# Patient Record
Sex: FEMALE | Race: WHITE
[De-identification: names, ages, dates, MRNs, and addresses within clinical notes are randomized per-mention and may not be internally consistent; named-entity substitution may affect disease eponyms.]

---

## 2017-03-13 ENCOUNTER — HOSPITAL ENCOUNTER (OUTPATIENT)
Dept: HOSPITAL 47 - RADMAMWWP | Age: 62
End: 2017-03-13
Attending: RADIOLOGY
Payer: COMMERCIAL

## 2017-03-13 DIAGNOSIS — Z85.3: Primary | ICD-10-CM

## 2017-03-13 NOTE — MM
Reason for exam: history of breast cancer, conservation therapy.

Last mammogram was performed 1 year ago.



History:

Patient is postmenopausal, has history of other cancer at age 54, has history of 

breast cancer at age 51, and is nulliparous.

Family history of breast cancer in sister at age 70.

Excisional biopsy of the left breast, September 21, 2007.  Malignant lumpectomy of

the left breast, September 21, 2007.  Malignant left mammotome panel of the left 

breast, September 12, 2007.  Radiation therapy of the left breast, 2007.

Took estrogen for 7 years 6 months beginning at age 44.  Took progesterone for 7 

years 6 months beginning at age 44.



Physical Findings:

Nurse did not find any significant physical abnormalities on exam.



MG 3D Diag Mammo W/Cad VIRAJ

Bilateral CC and MLO view(s) were taken.

Prior study comparison: March 10, 2016, bilateral MG 3d diag mammo w/cad VIRAJ.  

March 9, 2015, bilateral MG diagnostic mammo w CAD VIRAJ.

No significant new findings when compared with previous films.



These results were verbally communicated with the patient and result sheet given 

to the patient on 3/13/17.





ASSESSMENT: Benign, BI-RAD 2



RECOMMENDATION:

Follow-up diagnostic mammogram of both breasts in 1 year.

## 2018-03-15 ENCOUNTER — HOSPITAL ENCOUNTER (OUTPATIENT)
Dept: HOSPITAL 47 - RADMAMWWP | Age: 63
Discharge: HOME | End: 2018-03-15
Attending: RADIOLOGY
Payer: COMMERCIAL

## 2018-03-15 DIAGNOSIS — Z08: Primary | ICD-10-CM

## 2018-03-15 DIAGNOSIS — Z85.3: ICD-10-CM

## 2018-03-15 PROCEDURE — 77066 DX MAMMO INCL CAD BI: CPT

## 2018-03-15 NOTE — MM
Reason for exam: additional evaluation requested from prior study.

Last mammogram was performed 1 year ago.



History:

Patient is postmenopausal, has history of other cancer at age 54, has history of 

breast cancer at age 51, and is nulliparous.

Family history of breast cancer in sister at age 70.

Excisional biopsy of the left breast, September 21, 2007.  Malignant lumpectomy of

the left breast, September 21, 2007.  Malignant left mammotome panel of the left 

breast, September 12, 2007.  Radiation therapy of the left breast, 2007.

Took estrogen for 7 years 6 months beginning at age 44.  Took progesterone for 7 

years 6 months beginning at age 44.



Physical Findings:

Nurse did not find any significant physical abnormalities on exam.



MG 3D Diag Mammo W/Cad VIRAJ

Bilateral CC and MLO view(s) were taken.

Prior study comparison: March 13, 2017, bilateral MG 3d diag mammo w/cad VIRAJ.  

March 10, 2016, bilateral MG 3d diag mammo w/cad VIRAJ.

The breast tissue is heterogeneously dense. This may lower the sensitivity of 

mammography.  Post surgical changes in the left breast.

No significant new findings when compared with previous films.



These results were verbally communicated with the patient and result sheet given 

to the patient on 3/15/18.





ASSESSMENT: Benign, BI-RAD 2



RECOMMENDATION:

Routine screening mammogram of both breasts in 1 year.

## 2018-07-08 ENCOUNTER — HOSPITAL ENCOUNTER (EMERGENCY)
Dept: HOSPITAL 47 - EC | Age: 63
Discharge: HOME | End: 2018-07-08
Payer: COMMERCIAL

## 2018-07-08 VITALS
HEART RATE: 68 BPM | RESPIRATION RATE: 18 BRPM | DIASTOLIC BLOOD PRESSURE: 75 MMHG | TEMPERATURE: 98.4 F | SYSTOLIC BLOOD PRESSURE: 131 MMHG

## 2018-07-08 DIAGNOSIS — Z88.2: ICD-10-CM

## 2018-07-08 DIAGNOSIS — S42.255A: Primary | ICD-10-CM
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
## 2018-07-08 DIAGNOSIS — Z88.1: ICD-10-CM

## 2018-07-08 DIAGNOSIS — Z87.891: ICD-10-CM

## 2018-07-08 DIAGNOSIS — Z88.0: ICD-10-CM

## 2018-07-08 DIAGNOSIS — Z88.8: ICD-10-CM

## 2018-07-08 DIAGNOSIS — Z88.6: ICD-10-CM

## 2018-07-08 PROCEDURE — 99283 EMERGENCY DEPT VISIT LOW MDM: CPT

## 2018-07-08 NOTE — ED
General Adult HPI





- General


Chief complaint: Extremity Injury, Upper


Stated complaint: LEFT SHOULDER INJURY FROM FALL


Time Seen by Provider: 07/08/18 17:02


Source: patient, RN notes reviewed


Mode of arrival: ambulatory


Limitations: no limitations





- History of Present Illness


Initial comments: 





Patient 62-year-old female presenting to the emergency room today with a chief 

complaint of a fall that occurred approximately 12 skin appears that she was 

walking and then tripped over the first step onto the left shoulder.  She 

states there is pain locally states seems to be doing well at this time.  

States she has been able to ambulate.  Patient admits to pain in the left 

shoulder worse with movements.  Patient denies any other complaints or 

symptoms. Patient denies any recent fever, chills, shortness of breath, chest 

pain, back pain, abdominal pain, nausea or vomiting, numbness or tingling, 

headaches or visual changes, or any other complaints.





- Related Data


 Allergies











Allergy/AdvReac Type Severity Reaction Status Date / Time


 


acetaminophen [From Tylenol] Allergy  Rash/Hives Verified 07/08/18 16:36


 


cephalexin [From Keflex] Allergy  Rash/Hives Verified 07/08/18 16:36


 


Cephalosporins Allergy  Rash/Hives Verified 07/08/18 16:36


 


erythromycin base Allergy  Rash/Hives Verified 07/08/18 16:36


 


nitrofurantoin Allergy  Rash/Hives Verified 07/08/18 16:36





[From Macrodantin]     


 


Penicillins Allergy  Rash/Hives Verified 07/08/18 16:36


 


Sulfa (Sulfonamide Allergy  Rash/Hives Verified 07/08/18 16:36





Antibiotics)     


 


Tetracyclines Allergy  Rash/Hives Verified 07/08/18 16:36


 


anicin Allergy  Rash/Hives Uncoded 07/08/18 16:36














Review of Systems


ROS Statement: 


Those systems with pertinent positive or pertinent negative responses have been 

documented in the HPI.





ROS Other: All systems not noted in ROS Statement are negative.





Past Medical History


Past Medical History: Hyperlipidemia, Hypertension, Thyroid Disorder


History of Any Multi-Drug Resistant Organisms: None Reported


Additional Past Surgical History / Comment(s): thyroidectomy, endometrisis sx


Past Psychological History: Anxiety


Smoking Status: Former smoker


Past Alcohol Use History: Occasional


Past Drug Use History: None Reported





General Exam





- General Exam Comments


Initial Comments: 





General:  The patient is awake and alert, in no distress, and does not appear 

acutely ill.   


Neck:  The neck is supple, there is no tenderness or JVD.  


Cardiovascular:  There is a regular rate and rhythm. No murmur, rub or gallop 

is appreciated.


Respiratory:  Lungs are clear to auscultation, respirations are non-labored, 

breath sounds are equal.  No wheezes, stridor, rales, or rhonchi.


Musculoskeletal: Patient does have tenderness to the proximal humerus.  Shows 

limited range of motion due to pain.  Sensations are intact.  Radial pulses 2+.

  Strength from the left elbow and wrist and hand are 5/5.


Neurological:  A&O x 3. CN II-XII intact, There are no obvious motor or sensory 

deficits. Coordination appears grossly intact. Speech is normal.


Skin:  Skin is warm and dry and no rashes or lesions are noted. 


Psychiatric:  Normal mood and affect.  


Limitations: no limitations





Course


 Vital Signs











  07/08/18





  16:28


 


Temperature 98.4 F


 


Pulse Rate 68


 


Respiratory 18





Rate 


 


Blood Pressure 131/75


 


O2 Sat by Pulse 100





Oximetry 














Medical Decision Making





- Medical Decision Making





X-ray reviewed and does show fracture through the greater tuberosity 

nondisplaced.  Results were discussed with the patient.  Patient given arm 

sling and advised follow-up orthopedics.  Patient does not want anything for 

pain.  Will be discharged home advised to ice area.





Disposition


Clinical Impression: 


 Greater tuberosity of humerus fracture





Disposition: HOME SELF-CARE


Condition: Good


Additional Instructions: 


Please follow-up the family doctor or orthopedics as discussed.  Please use arm 

sling when up and moving around.  Please return to emergency room for any other 

concerns.


Is patient prescribed a controlled substance at d/c from ED?: No


Referrals: 


Pro Marie MD [Primary Care Provider] - 1-2 days


Abilio Rodriguez MD [STAFF PHYSICIAN] - 1-2 days


Time of Disposition: 17:40

## 2018-07-08 NOTE — XR
EXAMINATION TYPE: XR shoulder complete LT

 

DATE OF EXAM: 7/8/2018

 

COMPARISON: NONE

 

HISTORY: 62-year-old female with pain after fall

 

TECHNIQUE: 3 views

 

FINDINGS: 

Nondisplaced fracture of the greater tuberosity. Subacromial space is preserved. No subluxation or di
slocation. AC joint appears intact.

 

IMPRESSION: 

Nondisplaced greater tuberosity fracture. Appropriate orthopedic follow-up recommended.

## 2018-07-23 ENCOUNTER — HOSPITAL ENCOUNTER (OUTPATIENT)
Dept: HOSPITAL 47 - RADCTMAIN | Age: 63
Discharge: HOME | End: 2018-07-23
Attending: ORTHOPAEDIC SURGERY
Payer: COMMERCIAL

## 2018-07-23 DIAGNOSIS — S42.255A: Primary | ICD-10-CM

## 2018-07-23 NOTE — CT
EXAMINATION TYPE: CT shoulder LT wo con

 

DATE OF EXAM: 7/23/2018

 

COMPARISON: 7/8/2018 plain film

 

HISTORY: Left shoulder pain from fall

 

CT DLP: 313.80 mGycm

Automated exposure control for dose reduction was used. Technique: Axial images 2 mm thick sections. 
Reconstructed images in the sagittal and coronal plane. Three-D reconstructed images performed separa
tely on the [employer] computer are filmed and presented.

 

FINDINGS: 

The humeral head articulates with the glenoid. The acromioclavicular junction appears normal.

 

There is a subtle nondisplaced fracture of the greater tuberosity of the left humerus. No additional 
fractures are identified. Soft tissue density appears normal. No obvious large effusions are evident.
 There does appear to be some joint space narrowing suggestive for osteoarthritic degenerative change
 at the humeral glenoid junction.

 

IMPRESSION: 

SUBTLE NONDISPLACED FRACTURE GREATER TUBEROSITY LEFT HUMERUS. NO SIGNIFICANT INTERVAL HEALING IDENTIF
IED..

## 2018-12-31 ENCOUNTER — HOSPITAL ENCOUNTER (OUTPATIENT)
Dept: HOSPITAL 47 - RADCTMAIN | Age: 63
End: 2018-12-31
Attending: FAMILY MEDICINE
Payer: MEDICARE

## 2018-12-31 DIAGNOSIS — R94.02: ICD-10-CM

## 2018-12-31 DIAGNOSIS — I67.2: Primary | ICD-10-CM

## 2018-12-31 PROCEDURE — 70450 CT HEAD/BRAIN W/O DYE: CPT

## 2018-12-31 PROCEDURE — 93306 TTE W/DOPPLER COMPLETE: CPT

## 2018-12-31 PROCEDURE — 93880 EXTRACRANIAL BILAT STUDY: CPT

## 2018-12-31 NOTE — ECHOF
Referral Reason:R55 Syncope



MEASUREMENTS

--------

HEIGHT: 170.2 cm

WEIGHT: 71.7 kg

BP: 

IVSd:   1.2 cm     (0.6 - 1.1)

LVIDd:   3.9 cm     (3.9 - 5.3)

LVPWd:   1.5 cm     (0.6 - 1.1)

IVSs:   1.5 cm

LVIDs:   2.8 cm

LVPWs:   1.6 cm

LA Diam:   3.7 cm     (2.7 - 3.8)

RVIDd:   2.5 cm     (< 3.3)

LAESV Index (A-L):   28.73 ml/m

Ao Diam:   2.3 cm     (2.0 - 3.7)

LA Diam:   4.0 cm     (2.7 - 3.8)

AV Cusp:   1.6 cm     (1.5 - 2.6)

EPSS:   0.3 cm

MV E Oswald:   0.79 m/s

MV DecT:   253 ms

MV A Oswald:   0.70 m/s

MV E/A Ratio:   1.12 

RAP:   5.00 mmHg

RVSP:   12.42 mmHg

MV EF SLOPE:   90.62 mm/s     (70 - 150)

MV EXCURSION:   19.44 mm     (> 18.000)







FINDINGS

--------

Sinus rhythm.

This was a technically good study.

The left ventricular size is normal.   There is borderline concentric left ventricular hypertrophy.  
 Overall left ventricular systolic function is normal with, an EF between 55 - 60 %.

The right ventricle is normal in size.

The left atrial size is normal.

The right atrial size is normal.

The aortic valve is trileaflet, and appears structurally normal. No aortic stenosis or regurgitation.


Mild mitral regurgitation is present.

Mild tricuspid regurgitation present.   There is no evidence of pulmonary hypertension.   The right v
entricular systolic pressure, as measured by Doppler, is 12.42mmHg.

Trace/mild (physiologic)  pulmonic regurgitation.

The aortic root size is normal.

There is no pericardial effusion.



CONCLUSIONS

--------

1. The left ventricular size is normal.

2. There is borderline concentric left ventricular hypertrophy.

3. The right ventricle is normal in size.

4. The left atrial size is normal.

5. The right atrial size is normal.

6. The aortic valve is trileaflet, and appears structurally normal. No aortic stenosis or regurgitati
on.

7. Mild mitral regurgitation is present.

8. Mild tricuspid regurgitation present.

9. There is no evidence of pulmonary hypertension.

10. The right ventricular systolic pressure, as measured by Doppler, is 12.42mmHg.

11. Trace/mild (physiologic)  pulmonic regurgitation.

12. The aortic root size is normal.

13. There is no pericardial effusion.





SONOGRAPHER: Danelle Malone RDCS

## 2018-12-31 NOTE — CT
EXAMINATION TYPE: CT brain wo con

 

DATE OF EXAM: 12/31/2018

 

COMPARISON: None

 

HISTORY: Trouble with balance, equilibrium instability

 

CT DLP: 945.5 mGycm

Automated exposure control for dose reduction was used.

 

TECHNIQUE: CT scan of the head is performed without contrast.

 

FINDINGS:   There is no acute intracranial hemorrhage or midline shift identified. There is symmetric
 and mild ventricular and sulcal prominence consistent with diffuse age-related cerebral atrophy.  Th
ere is minimal low-attenuation in the periventricular white matter and within the lulu such as on axi
al image 13. No suspicious extra-axial fluid collection is seen. The globes are intact and the visual
ized sinuses are clear.   Chrissy bullosa are incidentally noted.

 

IMPRESSION:  

1. No acute intracranial hemorrhage or midline shift.

2. Mild periventricular and pontine hypointensity may be on the basis of microangiopathy however coul
d be further evaluated with MRI.

## 2018-12-31 NOTE — US
EXAMINATION TYPE: US carotid duplex BILAT

 

DATE OF EXAM: 12/31/2018

 

COMPARISON: NONE

 

CLINICAL HISTORY: R55 Syncope.

 

EXAM MEASUREMENTS: 

 

RIGHT:  Peak Systolic Velocity (PSV) cm/sec

----- Right CCA:  57.4  

----- Right ICA:  78.2     

----- Right ECA:  59.5   

ICA/CCA ratio:  1.4    

 

RIGHT:  End Diastole cm/sec

----- Right CCA:  17.3   

----- Right ICA:  28.5      

----- Right ECA:  7.2     

 

LEFT:  Peak Systolic Velocity (PSV) cm/sec

----- Left CCA:  68.3  

----- Left ICA:  60.9   

----- Left ECA:  67.0  

ICA/CCA ratio:  0.9  

 

LEFT:  End Diastole cm/sec

----- Left CCA:  17.8  

----- Left ICA:  25.0   

----- Left ECA:  8.4 

 

VERTEBRALS (direction of flow):

Right Vertebral: Antegrade

Left Vertebral: Antegrade

 

Rhythm:  Normal

 

No elevated velocities, no significant stenosis.

 

 

 

IMPRESSION:  Mild degree of grayscale atheromatous plaquing with no sonographically evident hemodynam
ically significant stenosis within either visualized carotid arterial system.   

 

 

Criteria for Assigning % of Stenosis / Diameter reduction

(Estimation based on the indirect measurements of the internal carotid artery velocities (ICA PSV).

1.  Normal (no stenosis)=ICA PSV < 125 cm/s: ratio < 2.0: ICA EDV<40 cm/s.

2. Less than 50% stenosis=ICA PSV < 125 cm/s: ratio < 2.0: ICA EDV<40 cm/s.

3.  50 to 69% stenosis=ICA PSV of 125 to 230 cm/s: ration 2.0 ? 4.0: ICA EDV  cm/s.

4.  Greater than 70% stenosis to near occlusion= ICA PSV > 230 cm/s: ratio > 4.0: ICA EDV > 100 cm/s.
 

5.  Near occlusion= ICA PSV velocities may be low or undetectable: variable ratio and ICA EDV.

6.  Total occlusion=unable to detect flow.

## 2019-03-18 ENCOUNTER — HOSPITAL ENCOUNTER (OUTPATIENT)
Dept: HOSPITAL 47 - RADMAMWWP | Age: 64
End: 2019-03-18
Attending: RADIOLOGY
Payer: MEDICARE

## 2019-03-18 DIAGNOSIS — Z85.3: ICD-10-CM

## 2019-03-18 DIAGNOSIS — Z08: Primary | ICD-10-CM

## 2019-03-18 PROCEDURE — 77062 BREAST TOMOSYNTHESIS BI: CPT

## 2019-03-18 PROCEDURE — 77066 DX MAMMO INCL CAD BI: CPT

## 2019-03-18 NOTE — MM
Reason for exam: additional evaluation requested from prior study.

Last mammogram was performed 1 year ago.



History:

Patient is postmenopausal, has history of other cancer at age 54, has history of 

breast cancer at age 51, and is nulliparous.

Family history of breast cancer in sister at age 70.

Excisional biopsy of the left breast, September 21, 2007.  Malignant lumpectomy of

the left breast, September 21, 2007.  Malignant left mammotome panel of the left 

breast, September 12, 2007.  Radiation therapy of the left breast, 2007.

Took estrogen for 7 years 6 months beginning at age 44.  Took progesterone for 7 

years 6 months beginning at age 44.



Physical Findings:

Nurse did not find any significant physical abnormalities on exam.



MG 3D Diag Mammo W/Cad VIRAJ

Bilateral CC and MLO view(s) were taken.

Prior study comparison: March 15, 2018, bilateral MG 3d diag mammo w/cad VIRAJ.  

March 13, 2017, bilateral MG 3d diag mammo w/cad VIRAJ.

The breast tissue is heterogeneously dense. This may lower the sensitivity of 

mammography.  Post surgical and post therapy changes left breast. Central 

asymmetric density right CC view is unchanged.



These results were verbally communicated with the patient and result sheet given 

to the patient on 3/18/19.





ASSESSMENT: Benign, BI-RAD 2



RECOMMENDATION:

Follow-up diagnostic mammogram of both breasts in 1 year.

## 2020-06-04 ENCOUNTER — HOSPITAL ENCOUNTER (OUTPATIENT)
Dept: HOSPITAL 47 - RADMAMWWP | Age: 65
Discharge: HOME | End: 2020-06-04
Payer: MEDICARE

## 2020-06-04 DIAGNOSIS — R92.8: Primary | ICD-10-CM

## 2020-06-04 DIAGNOSIS — Z85.3: ICD-10-CM

## 2020-06-04 PROCEDURE — 77066 DX MAMMO INCL CAD BI: CPT

## 2020-06-04 PROCEDURE — 76642 ULTRASOUND BREAST LIMITED: CPT

## 2020-06-04 PROCEDURE — 77062 BREAST TOMOSYNTHESIS BI: CPT

## 2020-06-09 NOTE — USB
Reason for exam: additional evaluation requested from abnormal screening.



History:

Patient is postmenopausal, has history of other cancer at age 54, has history of 

breast cancer at age 51, and is nulliparous.

Family history of breast cancer in sister at age 70.

Excisional biopsy of the left breast, September 21, 2007.  Malignant lumpectomy of

the left breast, September 21, 2007.  Malignant left mammotome panel of the left 

breast, September 12, 2007.  Radiation therapy of the left breast, 2007.

Took estrogen for 7 years 6 months beginning at age 44.  Took progesterone for 7 

years 6 months beginning at age 44.



US Breast Limited RT

Technologist: Quin Benoit

Right limited breast ultrasound including focal area of concern, retroareolar and 

axilla demonstrates a 0.4 x 0.4 x 0.3cm cystic lesion at 6 o'clock. No sonographic

correlate for the right mammographic asymmetry.



These results were verbally communicated with the patient and result sheet given 

to the patient on 6/4/20.





ASSESSMENT: Probably benign, BI-RAD 3



RECOMMENDATION:

Follow-up diagnostic mammogram of the right breast in 6 months.

## 2020-06-09 NOTE — MM
Reason for exam: additional evaluation requested from prior study.

Last mammogram was performed 1 year and 3 months ago.



History:

Patient is postmenopausal, has history of other cancer at age 54, has history of 

breast cancer at age 51, and is nulliparous.

Family history of breast cancer in sister at age 70.

Excisional biopsy of the left breast, September 21, 2007.  Malignant lumpectomy of

the left breast, September 21, 2007.  Malignant left mammotome panel of the left 

breast, September 12, 2007.  Radiation therapy of the left breast, 2007.

Took estrogen for 7 years 6 months beginning at age 44.  Took progesterone for 7 

years 6 months beginning at age 44.



Physical Findings:

Nurse did not find any significant physical abnormalities on exam.



MG 3D Diag Mammo W/Cad VIRAJ

Bilateral CC and MLO view(s) were taken.  ML and spot compression CC view(s) were 

taken of the right breast.

Prior study comparison: March 18, 2019, bilateral MG 3d diag mammo w/cad VIRAJ.  

March 15, 2018, bilateral MG 3d diag mammo w/cad VIRAJ.

The breast tissue is heterogeneously dense. This may lower the sensitivity of 

mammography.  Benign appearing bilateral calcifications. Lateral right posterior 

depth asymmetry persists on additional views with no ML correlate. Ultrasound will

be performed. Post therapy change on the left.



These results were verbally communicated with the patient and result sheet given 

to the patient on 6/4/20.





ASSESSMENT: Incomplete: need additional imaging evaluation, BI-RAD 0



RECOMMENDATION:

Ultrasound of the right breast.

## 2020-12-29 ENCOUNTER — HOSPITAL ENCOUNTER (EMERGENCY)
Dept: HOSPITAL 47 - EC | Age: 65
Discharge: HOME | End: 2020-12-29
Payer: MEDICARE

## 2020-12-29 VITALS
RESPIRATION RATE: 16 BRPM | SYSTOLIC BLOOD PRESSURE: 138 MMHG | DIASTOLIC BLOOD PRESSURE: 79 MMHG | TEMPERATURE: 98 F | HEART RATE: 65 BPM

## 2020-12-29 DIAGNOSIS — Z88.2: ICD-10-CM

## 2020-12-29 DIAGNOSIS — Z88.1: ICD-10-CM

## 2020-12-29 DIAGNOSIS — I10: ICD-10-CM

## 2020-12-29 DIAGNOSIS — S09.90XA: ICD-10-CM
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
## 2020-12-29 DIAGNOSIS — Z88.0: ICD-10-CM

## 2020-12-29 DIAGNOSIS — S63.502A: Primary | ICD-10-CM
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
## 2020-12-29 DIAGNOSIS — Z88.6: ICD-10-CM

## 2020-12-29 DIAGNOSIS — Z88.8: ICD-10-CM

## 2020-12-29 DIAGNOSIS — E78.5: ICD-10-CM

## 2020-12-29 PROCEDURE — 72125 CT NECK SPINE W/O DYE: CPT

## 2020-12-29 PROCEDURE — 99284 EMERGENCY DEPT VISIT MOD MDM: CPT

## 2020-12-29 PROCEDURE — 70450 CT HEAD/BRAIN W/O DYE: CPT

## 2020-12-29 NOTE — CT
EXAMINATION TYPE: CT brain rustam wo con

 

DATE OF EXAM: 12/29/2020

 

COMPARISON: CT brain 12/31/2018

 

HISTORY: Fall, hit back of head

 

CT DLP: 1276.5 mGycm

Automated exposure control for dose reduction was used.

 

TECHNIQUE: CT scan of the head and cervical spine are performed without contrast.

 

FINDINGS:   There is no acute intracranial hemorrhage, mass effect, or midline shift identified.  The
 ventricles and sulci are within normal limits in size.  The globes are intact and the visualized sin
uses are clear.

 

Cervical spine is visualized in its entirety from C1 through upper thoracic levels and demonstrates s
atisfactory alignment without evidence of acute fracture or dislocation.  Prevertebral soft tissue ap
pears within normal limits. Degenerative disc changes are present in the visualized spine, there is f
acet arthropathy change, there is a spinal curvature present centered at the lower cervical spine The
 C1-C2 articulation is unremarkable.  

 

IMPRESSION:

1. There is no acute fracture or dislocation evident in the cervical spine.

2. No acute intracranial hemorrhage, mass effect, or midline shift is seen.

## 2020-12-29 NOTE — ED
Fall HPI





- General


Chief Complaint: Fall


Stated Complaint: Fall


Time Seen by Provider: 12/29/20 14:34


Source: patient, RN notes reviewed


Mode of arrival: ambulatory


Limitations: no limitations





- History of Present Illness


Initial Comments: 





65-year-old female presents emergency Department with chief complaint of fall.  

Patient states she slipped on some ice fell backwards struck her head on 

concrete steps.  Patient states she also injured her left wrist.  Patient and no

loss conscious.  Patient does take aspirin patient does not take any blood 

thinners.  Denies any back pain, hip pain.  She is able to ambulate.  She states

is moderate swelling to her left wrist.  Patient states there is pain with 

movement and palpation to her left wrist she is right-hand-dominant patient 

offers no other complaints.





- Related Data


                                    Allergies











Allergy/AdvReac Type Severity Reaction Status Date / Time


 


acetaminophen [From Tylenol] Allergy  Rash/Hives Verified 12/29/20 14:28


 


cephalexin [From Keflex] Allergy  Rash/Hives Verified 12/29/20 14:28


 


Cephalosporins Allergy  Rash/Hives Verified 12/29/20 14:28


 


erythromycin base Allergy  Rash/Hives Verified 12/29/20 14:28


 


nitrofurantoin Allergy  Rash/Hives Verified 12/29/20 14:28





[From Macrodantin]     


 


Penicillins Allergy  Rash/Hives Verified 12/29/20 14:28


 


Sulfa (Sulfonamide Allergy  Rash/Hives Verified 12/29/20 14:28





Antibiotics)     


 


Tetracyclines Allergy  Rash/Hives Verified 12/29/20 14:28


 


anicin Allergy  Rash/Hives Uncoded 12/29/20 14:28














Review of Systems


ROS Statement: 


Those systems with pertinent positive or pertinent negative responses have been 

documented in the HPI.





ROS Other: All systems not noted in ROS Statement are negative.





Past Medical History


Past Medical History: Hyperlipidemia, Hypertension, Thyroid Disorder


History of Any Multi-Drug Resistant Organisms: None Reported


Additional Past Surgical History / Comment(s): thyroidectomy, endometrisis sx


Past Psychological History: Anxiety


Smoking Status: Never smoker


Past Alcohol Use History: Occasional


Past Drug Use History: None Reported





General Exam


Limitations: no limitations


General appearance: alert, in no apparent distress


Head exam: Present: atraumatic, normocephalic, normal inspection


Eye exam: Present: normal appearance, PERRL, EOMI.  Absent: scleral icterus, 

conjunctival injection, periorbital swelling


ENT exam: Present: normal exam, normal oropharynx, mucous membranes moist


Neck exam: Present: normal inspection, full ROM.  Absent: tenderness, 

meningismus, lymphadenopathy


Respiratory exam: Present: normal lung sounds bilaterally.  Absent: respiratory 

distress, wheezes, rales, rhonchi, stridor


Cardiovascular Exam: Present: regular rate, normal rhythm, normal heart sounds. 

Absent: systolic murmur, diastolic murmur, rubs, gallop, clicks


Extremities exam: Present: other (Left wrist there is moderate swelling noted, 

tenderness over mid wrist, there is no scaphoid tenderness)


Back exam: Present: full ROM.  Absent: tenderness


Neurological exam: Present: alert, oriented X3, CN II-XII intact, reflexes 

normal.  Absent: motor sensory deficit


Skin exam: Present: warm, dry, intact, normal color.  Absent: rash





Course


                                   Vital Signs











  12/29/20





  14:28


 


Temperature 98 F


 


Pulse Rate 65


 


Respiratory 16





Rate 


 


Blood Pressure 138/79


 


O2 Sat by Pulse 99





Oximetry 














Medical Decision Making





- Medical Decision Making





CT of the brain, C-spine are unremarkable.  X-ray reviewed of the left wrist 

with no acute osseous abnormality.  Patient has no scaphoid tenderness.  

Patient's left wrist sprain, mild head injury.  Patient be discharged in stable 

condition.





Disposition


Clinical Impression: 


 Fall, Head injury, Left wrist sprain





Disposition: HOME SELF-CARE


Condition: Stable


Instructions (If sedation given, give patient instructions):  Head Injury (ED), 

Wrist Sprain (ED)


Additional Instructions: 


Please return to the Emergency Department if symptoms worsen or any other 

concerns.


Is patient prescribed a controlled substance at d/c from ED?: No


Referrals: 


Germania Domingo DO [Primary Care Provider] - 1-2 days


Angel Luis Rios DO [Doctor of Osteopathic Medicine] - 1-2 days


Time of Disposition: 15:55

## 2020-12-29 NOTE — XR
Left wrist

 

HISTORY: Pain, trauma

 

4 views of the left wrist

 

Bone mineralization, joint spaces and alignment are maintained.

 

IMPRESSION: No fracture or dislocation is evident. Follow-up as indicated.

## 2021-06-29 ENCOUNTER — HOSPITAL ENCOUNTER (OUTPATIENT)
Dept: HOSPITAL 47 - RADNMMAIN | Age: 66
Discharge: HOME | End: 2021-06-29
Attending: FAMILY MEDICINE
Payer: MEDICARE

## 2021-06-29 DIAGNOSIS — R94.31: Primary | ICD-10-CM

## 2021-06-29 PROCEDURE — 93351 STRESS TTE COMPLETE: CPT

## 2021-06-29 NOTE — P.STRESS
- Stress Test Note


Stress Test Results/Findings: 


Exam Performed:  dobutamine stress echo


Exam Date: 06/29/21


Reason for Exam: Abnormal EKG





Height: 5 ft 7 in


Weight: 77.564 kg


Protocol: Dobutamine


Stage: 40


Duration of Exercise: 14:20





Resting Heart Rate: 64


Resting Blood Pressure: 127/50


Maximum Achieved Heart Rate: 104


Maximum Achieved Blood Pressure: 232/50


85% PMHR: 130


100% PMHR: 153


METS: na


Technologist Comment: 





Stress Test Results/Findings: 


This is a 65-year-old female with history of hypertension, hypercholesterolemia 

and tobacco use being evaluated for cardiac status because of abnormal EKG.





Stress data: Blood pressure at rest is 127/50 with a pulse rate of 64.  EKG at 

baseline showed sinus rhythm with normal OK interval and QRS duration.  Blood 

pressure at rest is 127/50, pulse rate 64.  A standard dose of dobutamine was 

initiated and was titrated to maximum 40 mics, achieving a maximal rate of only 

and for the blood pressure was about 232/50.  EKGs taken during or after the 

dobutamine infusion did not reveal an significant changes from the baseline.





Echo data: Baseline echo images show normal wall motion and thickening.  Images 

taken at low dose and high dose dobutamine showed augmentation of wall motion 

and thickening in all the segments.  However, patient did not achieve 85% 

predicted heart rate.  Achieved only 68% of the predicted heart rate.





Final impression #1.  Nondiagnostic dobutamine stress test because of inadequate

heart rate response #2.  Nondiagnostic an inconclusive dobutamine stress echo.  

However, at about 68% of the predicted heart rate, no ischemic changes noted

## 2021-07-01 NOTE — ECHOS
Stress Test Results/Findings: 

Exam Performed:  dobutamine stress echo

Exam Date: 06/29/21

Reason for Exam: Abnormal EKG



Height: 5 ft 7 in

Weight: 77.564 kg

Protocol: Dobutamine

Stage: 40

Duration of Exercise: 14:20



Resting Heart Rate: 64

Resting Blood Pressure: 127/50

Maximum Achieved Heart Rate: 104

Maximum Achieved Blood Pressure: 232/50

85% PMHR: 130

100% PMHR: 153

METS: na

Technologist Comment: 



Stress Test Results/Findings: 

This is a 65-year-old female with history of hypertension, hypercholesterolemia 
and tobacco use being evaluated for cardiac status because of abnormal EKG.



Stress data: Blood pressure at rest is 127/50 with a pulse rate of 64.  EKG at 
baseline showed sinus rhythm with normal AL interval and QRS duration.  Blood 
pressure at rest is 127/50, pulse rate 64.  A standard dose of dobutamine was 
initiated and was titrated to maximum 40 mics, achieving a maximal rate of only 
and for the blood pressure was about 232/50.  EKGs taken during or after the 
dobutamine infusion did not reveal an significant changes from the baseline.



Echo data: Baseline echo images show normal wall motion and thickening.  Images 
taken at low dose and high dose dobutamine showed augmentation of wall motion 
and thickening in all the segments.  However, patient did not achieve 85% 
predicted heart rate.  Achieved only 68% of the predicted heart rate.



Final impression #1.  Nondiagnostic dobutamine stress test because of inadequate
heart rate response #2.  Nondiagnostic an inconclusive dobutamine stress echo.  
However, at about 68% of the predicted heart rate, no ischemic changes noted

MTDD

## 2024-11-13 ENCOUNTER — HOSPITAL ENCOUNTER (OUTPATIENT)
Dept: HOSPITAL 47 - PROCWHC3 | Age: 69
End: 2024-11-13
Attending: INTERNAL MEDICINE
Payer: MEDICARE

## 2024-11-13 VITALS
RESPIRATION RATE: 16 BRPM | TEMPERATURE: 98.3 F | HEART RATE: 61 BPM | SYSTOLIC BLOOD PRESSURE: 164 MMHG | DIASTOLIC BLOOD PRESSURE: 82 MMHG

## 2024-11-13 DIAGNOSIS — M81.0: Primary | ICD-10-CM

## 2024-11-13 PROCEDURE — 96365 THER/PROPH/DIAG IV INF INIT: CPT

## 2024-11-13 RX ADMIN — ZOLEDRONIC ACID NR MLS/HR: 4 INJECTION, SOLUTION, CONCENTRATE INTRAVENOUS at 14:17

## 2024-11-13 RX ADMIN — NICARDIPINE HYDROCHLORIDE PRN MLS/HR: 2.5 INJECTION INTRAVENOUS at 14:17

## 2025-07-17 ENCOUNTER — HOSPITAL ENCOUNTER (OUTPATIENT)
Dept: HOSPITAL 47 - RADMAMWWP | Age: 70
Discharge: HOME | End: 2025-07-17
Attending: INTERNAL MEDICINE
Payer: MEDICARE

## 2025-07-17 ENCOUNTER — HOSPITAL ENCOUNTER (OUTPATIENT)
Dept: HOSPITAL 47 - RADXRMAIN | Age: 70
Discharge: HOME | End: 2025-07-17
Attending: INTERNAL MEDICINE
Payer: MEDICARE

## 2025-07-17 DIAGNOSIS — M47.814: Primary | ICD-10-CM

## 2025-07-17 DIAGNOSIS — R92.333: Primary | ICD-10-CM

## 2025-07-17 DIAGNOSIS — Z78.0: ICD-10-CM

## 2025-07-17 DIAGNOSIS — Z80.3: ICD-10-CM

## 2025-07-17 DIAGNOSIS — Z92.3: ICD-10-CM

## 2025-07-17 DIAGNOSIS — Z85.3: ICD-10-CM

## 2025-07-17 PROCEDURE — 72072 X-RAY EXAM THORAC SPINE 3VWS: CPT

## 2025-07-17 PROCEDURE — 77062 BREAST TOMOSYNTHESIS BI: CPT

## 2025-07-17 PROCEDURE — 77066 DX MAMMO INCL CAD BI: CPT
